# Patient Record
Sex: FEMALE | Race: WHITE | NOT HISPANIC OR LATINO
[De-identification: names, ages, dates, MRNs, and addresses within clinical notes are randomized per-mention and may not be internally consistent; named-entity substitution may affect disease eponyms.]

---

## 2017-08-30 ENCOUNTER — APPOINTMENT (OUTPATIENT)
Dept: GASTROENTEROLOGY | Facility: CLINIC | Age: 77
End: 2017-08-30
Payer: MEDICARE

## 2017-08-30 PROCEDURE — 99214 OFFICE O/P EST MOD 30 MIN: CPT

## 2017-08-30 PROCEDURE — 82274 ASSAY TEST FOR BLOOD FECAL: CPT | Mod: QW

## 2017-08-30 PROCEDURE — 99204 OFFICE O/P NEW MOD 45 MIN: CPT

## 2018-07-12 ENCOUNTER — TRANSCRIPTION ENCOUNTER (OUTPATIENT)
Age: 78
End: 2018-07-12

## 2019-09-24 ENCOUNTER — APPOINTMENT (OUTPATIENT)
Dept: GASTROENTEROLOGY | Facility: CLINIC | Age: 79
End: 2019-09-24
Payer: MEDICARE

## 2019-09-24 VITALS
WEIGHT: 173 LBS | BODY MASS INDEX: 33.96 KG/M2 | HEIGHT: 60 IN | SYSTOLIC BLOOD PRESSURE: 135 MMHG | DIASTOLIC BLOOD PRESSURE: 85 MMHG | HEART RATE: 90 BPM

## 2019-09-24 DIAGNOSIS — I10 ESSENTIAL (PRIMARY) HYPERTENSION: ICD-10-CM

## 2019-09-24 DIAGNOSIS — D47.2 MONOCLONAL GAMMOPATHY: ICD-10-CM

## 2019-09-24 DIAGNOSIS — E11.9 TYPE 2 DIABETES MELLITUS W/OUT COMPLICATIONS: ICD-10-CM

## 2019-09-24 DIAGNOSIS — K57.30 DIVERTICULOSIS OF LARGE INTESTINE W/OUT PERFORATION OR ABSCESS W/OUT BLEEDING: ICD-10-CM

## 2019-09-24 DIAGNOSIS — Z80.0 FAMILY HISTORY OF MALIGNANT NEOPLASM OF DIGESTIVE ORGANS: ICD-10-CM

## 2019-09-24 DIAGNOSIS — Z86.2 PERSONAL HISTORY OF DISEASES OF THE BLOOD AND BLOOD-FORMING ORGANS AND CERTAIN DISORDERS INVOLVING THE IMMUNE MECHANISM: ICD-10-CM

## 2019-09-24 DIAGNOSIS — E78.5 HYPERLIPIDEMIA, UNSPECIFIED: ICD-10-CM

## 2019-09-24 DIAGNOSIS — E66.9 OBESITY, UNSPECIFIED: ICD-10-CM

## 2019-09-24 DIAGNOSIS — E03.9 HYPOTHYROIDISM, UNSPECIFIED: ICD-10-CM

## 2019-09-24 DIAGNOSIS — Z86.010 PERSONAL HISTORY OF COLONIC POLYPS: ICD-10-CM

## 2019-09-24 DIAGNOSIS — Z82.49 FAMILY HISTORY OF ISCHEMIC HEART DISEASE AND OTHER DISEASES OF THE CIRCULATORY SYSTEM: ICD-10-CM

## 2019-09-24 DIAGNOSIS — I72.8 ANEURYSM OF OTHER SPECIFIED ARTERIES: ICD-10-CM

## 2019-09-24 DIAGNOSIS — R10.10 UPPER ABDOMINAL PAIN, UNSPECIFIED: ICD-10-CM

## 2019-09-24 DIAGNOSIS — K76.0 FATTY (CHANGE OF) LIVER, NOT ELSEWHERE CLASSIFIED: ICD-10-CM

## 2019-09-24 DIAGNOSIS — I34.1 NONRHEUMATIC MITRAL (VALVE) PROLAPSE: ICD-10-CM

## 2019-09-24 PROCEDURE — 99214 OFFICE O/P EST MOD 30 MIN: CPT

## 2019-09-24 PROCEDURE — 82274 ASSAY TEST FOR BLOOD FECAL: CPT | Mod: QW

## 2019-09-24 RX ORDER — LOSARTAN POTASSIUM AND HYDROCHLOROTHIAZIDE 50; 12.5 MG/1; MG/1
50-12.5 TABLET, FILM COATED ORAL
Refills: 0 | Status: ACTIVE | COMMUNITY

## 2019-09-24 RX ORDER — ATORVASTATIN CALCIUM 20 MG/1
20 TABLET, FILM COATED ORAL
Refills: 0 | Status: ACTIVE | COMMUNITY

## 2019-09-24 RX ORDER — METFORMIN HYDROCHLORIDE 500 MG/1
500 TABLET, COATED ORAL
Refills: 0 | Status: ACTIVE | COMMUNITY

## 2019-09-24 NOTE — HISTORY OF PRESENT ILLNESS
[FreeTextEntry1] : For several weeks, Sugar has noted intermittent upper abdominal pain. However, she awakened 9/22 with pain, shaking chills and low-grade fever. The fever subsequently went to as high as 101.9°, and then 100° by that evening. For a short time, the pain traveled to the LLQ, but was intermittent rather than steady. The pain has finally subsided, but she notes residual nausea. She had tried some Pepto-Bismol, with improvement. She wonders if she might have had pancreatitis or diverticulitis.

## 2019-09-24 NOTE — REVIEW OF SYSTEMS
[Fever] : fever [Feeling Tired] : feeling tired [Heart Rate Is Fast] : fast heart rate [Feeling Poorly] : feeling poorly [Lower Ext Edema] : lower extremity edema [Shortness Of Breath] : shortness of breath [Negative] : Heme/Lymph

## 2019-09-24 NOTE — REASON FOR VISIT
[Follow-Up: _____] : a [unfilled] follow-up visit [Spouse] : spouse [FreeTextEntry1] : Abdominal pain, nausea, fever

## 2019-09-24 NOTE — ASSESSMENT
[FreeTextEntry1] : 1. Recent epigastric pain, nausea, fever--possible cholelithiasis, viral syndrome, diverticulitis, pancreatitis.\par 2. History of Helicobacter pylori gastritis, duodenitis at EGD August 2013.\par 3. Remote history of colonic polyps; diverticulosis and hemorrhoids at last colonoscopy August 2013; family history of colon cancer (mother). Status post sigmoid resection for diverticulitis 2009. Stool guaiac negative with negative FIT on exam. \par 4. History of iron deficiency anemia, negative capsule endoscopy 2014.\par 5. Hepatic steatosis, without cirrhosis.\par 6. Obesity.\par 7. Type 2 diabetes mellitus.\par 8. Status post right breast lumpectomy for DCIS 2012.\par 9. MGUS, with faint IgM-lambda monoclonal protein with normal free K/L ratio.\par 10. Hypertension.\par 11. Hypothyroidism.\par 12. Hyperlipidemia.\par 13. Mitral valve prolapse.\par 14. Osteoarthritis, status post synovial cyst excision 2016.\par 15. Splenic artery aneurysm, occluded inferior mesenteric artery.\par 16. Status post WHITNEY, appendectomy, 3 C-sections, hemorrhoidectomies.\par 17. Mild CAD on CT angiography.\par 18. Allergic to Flagyl, codeine, Valium, tramadol.\par \par Plan:\par 1. Medical records, including recent labs and CTA reviewed.\par 2. CT scan abdomen/pelvis with oral and intravenous contrast. She is aware of the need to hold metformin around the time of the test.\par 3. Pepto-Bismol as needed for now.\par 4. Other recommendations depending on results of above and clinical course.\par 5. Get an internist!!

## 2019-09-24 NOTE — PHYSICAL EXAM
[General Appearance - Alert] : alert [General Appearance - In No Acute Distress] : in no acute distress [General Appearance - Well Nourished] : well nourished [General Appearance - Well Developed] : well developed [Sclera] : the sclera and conjunctiva were normal [Outer Ear] : the ears and nose were normal in appearance [Oropharynx] : the oropharynx was normal [Neck Cervical Mass (___cm)] : no neck mass was observed [Neck Appearance] : the appearance of the neck was normal [Thyroid Diffuse Enlargement] : the thyroid was not enlarged [Jugular Venous Distention Increased] : there was no jugular-venous distention [Thyroid Nodule] : there were no palpable thyroid nodules [Auscultation Breath Sounds / Voice Sounds] : lungs were clear to auscultation bilaterally [Heart Rate And Rhythm] : heart rate was normal and rhythm regular [Murmurs] : no murmurs [Heart Sounds Gallop] : no gallops [Heart Sounds Pericardial Friction Rub] : no pericardial rub [Edema] : there was no peripheral edema [Full Pulse] : the pedal pulses are present [Bowel Sounds] : normal bowel sounds [Abdomen Tenderness] : non-tender [Abdomen Soft] : soft [Abdomen Mass (___ Cm)] : no abdominal mass palpated [Normal Sphincter Tone] : normal sphincter tone [No Rectal Mass] : no rectal mass [Internal Hemorrhoid] : internal hemorrhoids [Cervical Lymph Nodes Enlarged Posterior Bilaterally] : posterior cervical [Supraclavicular Lymph Nodes Enlarged Bilaterally] : supraclavicular [Cervical Lymph Nodes Enlarged Anterior Bilaterally] : anterior cervical [Inguinal Lymph Nodes Enlarged Bilaterally] : inguinal [Axillary Lymph Nodes Enlarged Bilaterally] : axillary [Femoral Lymph Nodes Enlarged Bilaterally] : femoral [No CVA Tenderness] : no ~M costovertebral angle tenderness [No Spinal Tenderness] : no spinal tenderness [Nail Clubbing] : no clubbing  or cyanosis of the fingernails [Abnormal Walk] : normal gait [Motor Tone] : muscle strength and tone were normal [Musculoskeletal - Swelling] : no joint swelling seen [Skin Turgor] : normal skin turgor [Skin Color & Pigmentation] : normal skin color and pigmentation [Oriented To Time, Place, And Person] : oriented to person, place, and time [] : no rash [Affect] : the affect was normal [Impaired Insight] : insight and judgment were intact [FreeTextEntry1] : reducible umbilical hernia; surgical scars [External Hemorrhoid] : no external hemorrhoids [Occult Blood Positive] : stool was negative for occult blood

## 2019-09-25 ENCOUNTER — TRANSCRIPTION ENCOUNTER (OUTPATIENT)
Age: 79
End: 2019-09-25

## 2019-09-26 ENCOUNTER — CLINICAL ADVICE (OUTPATIENT)
Age: 79
End: 2019-09-26

## 2019-10-03 ENCOUNTER — LABORATORY RESULT (OUTPATIENT)
Age: 79
End: 2019-10-03

## 2019-10-03 ENCOUNTER — CLINICAL ADVICE (OUTPATIENT)
Age: 79
End: 2019-10-03

## 2019-10-04 ENCOUNTER — APPOINTMENT (OUTPATIENT)
Dept: GASTROENTEROLOGY | Facility: CLINIC | Age: 79
End: 2019-10-04
Payer: MEDICARE

## 2019-10-04 PROCEDURE — 43239 EGD BIOPSY SINGLE/MULTIPLE: CPT

## 2019-10-07 ENCOUNTER — OTHER (OUTPATIENT)
Age: 79
End: 2019-10-07

## 2019-10-11 ENCOUNTER — CHART COPY (OUTPATIENT)
Age: 79
End: 2019-10-11

## 2019-10-18 ENCOUNTER — CLINICAL ADVICE (OUTPATIENT)
Age: 79
End: 2019-10-18

## 2019-10-28 ENCOUNTER — APPOINTMENT (OUTPATIENT)
Dept: GASTROENTEROLOGY | Facility: CLINIC | Age: 79
End: 2019-10-28
Payer: MEDICARE

## 2019-10-28 VITALS
HEIGHT: 60 IN | WEIGHT: 175.5 LBS | HEART RATE: 74 BPM | BODY MASS INDEX: 34.46 KG/M2 | SYSTOLIC BLOOD PRESSURE: 132 MMHG | DIASTOLIC BLOOD PRESSURE: 83 MMHG

## 2019-10-28 DIAGNOSIS — B96.81 GASTRITIS, UNSPECIFIED, W/OUT BLEEDING: ICD-10-CM

## 2019-10-28 DIAGNOSIS — K80.20 CALCULUS OF GALLBLADDER W/OUT CHOLECYSTITIS W/OUT OBSTRUCTION: ICD-10-CM

## 2019-10-28 DIAGNOSIS — K29.70 GASTRITIS, UNSPECIFIED, W/OUT BLEEDING: ICD-10-CM

## 2019-10-28 PROCEDURE — 99214 OFFICE O/P EST MOD 30 MIN: CPT

## 2019-10-28 RX ORDER — AMOXICILLIN 500 MG/1
500 TABLET, FILM COATED ORAL TWICE DAILY
Qty: 56 | Refills: 0 | Status: DISCONTINUED | COMMUNITY
Start: 2019-10-11 | End: 2019-10-28

## 2019-10-28 RX ORDER — CLARITHROMYCIN 500 MG/1
500 TABLET, FILM COATED ORAL TWICE DAILY
Qty: 28 | Refills: 0 | Status: DISCONTINUED | COMMUNITY
Start: 2019-10-11 | End: 2019-10-28

## 2019-10-28 NOTE — ASSESSMENT
[FreeTextEntry1] : 1. Recent epigastric pain, nausea, with Helicobacter pylori gastritis, gastric intestinal metaplasia, nonerosive esophagitis at repeat EGD October 2019--difficulty tolerating clarithromycin, but may very well have cleared the H. pylori.\par 2. Remote history of colonic polyps; diverticulosis and hemorrhoids at last colonoscopy August 2013; family history of colon cancer (mother). Status post sigmoid resection for diverticulitis 2009. Stool guaiac negative with negative FIT on exam. \par 3. History of iron deficiency anemia, negative capsule endoscopy 2014.\par 4. Hepatic steatosis, without cirrhosis. Gallstones, likely asymptomatic. \par 5. Obesity.\par 6. Type 2 diabetes mellitus.\par 7. Status post right breast lumpectomy for DCIS 2012.\par 8. MGUS, with faint IgM-lambda monoclonal protein with normal free K/L ratio.\par 9. Hypertension.\par 10. Hypothyroidism.\par 11. Hyperlipidemia.\par 12. Mitral valve prolapse.\par 13. Osteoarthritis, status post synovial cyst excision 2016.\par 14. Splenic artery aneurysm, occluded inferior mesenteric artery.\par 15. Status post WHITNEY, appendectomy, 3 C-sections, hemorrhoidectomies.\par 16. Mild CAD on CT angiography.\par 17. Allergic to Flagyl, codeine, Valium, tramadol; intolerant of clarithromycin..\par \par Plan:\par 1. Labs reviewed. \par 2. Continue omeprazole daily on awakening for at least a couple of months.\par 3. Submit stool specimen to lab for Helicobacter pylori antigen in mid-November.\par 4. She will call for followup soon thereafter.\par 5. Get an internist!!

## 2019-10-28 NOTE — HISTORY OF PRESENT ILLNESS
[FreeTextEntry1] : EGD 10/4/19 by Dr. Hester revealed Helicobacter pylori gastritis with gastric intestinal metaplasia, and nonerosive reflux. She was taking triple therapy for one week, but telephoned on 10/18, complaining of nausea, decreased appetite, lethargy, and difficulty sleeping. I advised her to stop the antibiotics, although I had suspected that clarithromycin was the more likely culprit (she has taken amoxicillin on multiple occasions without problem). By 10/22, she was very beginning to feel back to baseline,t with no further abdominal pain. Labs from 10/17 were also reviewed.

## 2019-11-15 ENCOUNTER — TRANSCRIPTION ENCOUNTER (OUTPATIENT)
Age: 79
End: 2019-11-15

## 2019-11-21 ENCOUNTER — OTHER (OUTPATIENT)
Age: 79
End: 2019-11-21

## 2020-01-02 ENCOUNTER — CLINICAL ADVICE (OUTPATIENT)
Age: 80
End: 2020-01-02

## 2020-01-13 ENCOUNTER — APPOINTMENT (OUTPATIENT)
Dept: GASTROENTEROLOGY | Facility: CLINIC | Age: 80
End: 2020-01-13
Payer: MEDICARE

## 2020-01-13 VITALS
BODY MASS INDEX: 34.16 KG/M2 | WEIGHT: 174 LBS | HEART RATE: 97 BPM | SYSTOLIC BLOOD PRESSURE: 116 MMHG | DIASTOLIC BLOOD PRESSURE: 71 MMHG | HEIGHT: 60 IN

## 2020-01-13 DIAGNOSIS — R11.0 NAUSEA: ICD-10-CM

## 2020-01-13 DIAGNOSIS — R10.13 EPIGASTRIC PAIN: ICD-10-CM

## 2020-01-13 DIAGNOSIS — R14.0 ABDOMINAL DISTENSION (GASEOUS): ICD-10-CM

## 2020-01-13 PROCEDURE — 99214 OFFICE O/P EST MOD 30 MIN: CPT

## 2020-01-13 RX ORDER — CYANOCOBALAMIN (VITAMIN B-12) 1000 MCG
TABLET ORAL
Refills: 0 | Status: ACTIVE | COMMUNITY

## 2020-01-13 RX ORDER — METFORMIN ER 500 MG 500 MG/1
500 TABLET ORAL
Qty: 90 | Refills: 0 | Status: ACTIVE | COMMUNITY
Start: 2019-07-11

## 2020-01-13 NOTE — REVIEW OF SYSTEMS
[Chills] : chills [Feeling Tired] : feeling tired [Feeling Poorly] : feeling poorly [Heart Rate Is Slow] : slow heart rate [Abdominal Pain] : abdominal pain [Diarrhea] : diarrhea [Feelings Of Weakness] : feelings of weakness [Negative] : Heme/Lymph [As Noted in HPI] : as noted in HPI

## 2020-01-16 ENCOUNTER — OTHER (OUTPATIENT)
Age: 80
End: 2020-01-16

## 2020-01-16 NOTE — HISTORY OF PRESENT ILLNESS
[FreeTextEntry1] : For the past couple weeks, she has noted relatively constant low retrosternal and epigastric burning, decreased appetite, nausea, excessive flatulence, and loose stools. She has only been able to tolerate some clear broth. Temperatures have run between 97 and 100°. She has tried Pepto-Bismol with only minor improvement. She is not currently taking PPI. Recent labs suggested the possibility of UTI, but C&S was not performed. She wonders if she might have Helicobacter pylori back (stool negative in November). She is scheduled to cruise on the "[employer] from [place] to [place] at the end of next week.

## 2020-01-16 NOTE — ASSESSMENT
[FreeTextEntry1] : 1. Recent epigastric and low retrosternal pain, anorexia, nausea, loose stools, gassiness; Helicobacter pylori gastritis, gastric intestinal metaplasia, nonerosive esophagitis at repeat EGD October 2019--note abnormal urinalysis, suggests possible UTI. May have associated UGI motility disorder, bacterial overgrowth, Clostridium difficile, recurrent H. pylori, etc. \par 2. Remote history of colonic polyps; diverticulosis and hemorrhoids at last colonoscopy August 2013; family history of colon cancer (mother). Status post sigmoid resection for diverticulitis 2009.  \par 3. History of iron deficiency anemia, negative capsule endoscopy 2014.\par 4. Hepatic steatosis, without cirrhosis. Gallstones, likely asymptomatic. \par 5. Obesity.\par 6. Type 2 diabetes mellitus.\par 7. Status post right breast lumpectomy for DCIS 2012.\par 8. MGUS, with faint IgM-lambda monoclonal protein with normal free K/L ratio.\par 9. Hypertension.\par 10. Hypothyroidism.\par 11. Hyperlipidemia.\par 12. Mitral valve prolapse.\par 13. Osteoarthritis, status post synovial cyst excision 2016.\par 14. Splenic artery aneurysm, occluded inferior mesenteric artery.\par 15. Status post WHITNEY, appendectomy, 3 C-sections, hemorrhoidectomies.\par 16. Mild CAD on CT angiography.\par 17. Allergic to Flagyl, codeine, Valium, tramadol; intolerant of clarithromycin..\par \par Plan:\par 1. Labs reviewed. \par 2. Submit urine specimen for C&S; if positive, she will contact her other doctors for treatment.\par 3. Rechallenge with Omeprazole 20 mg b.i.d. on an empty stomach.\par 4. Consider checking stools for H. pylori and and  C. difficile, depending on clinical course.\par 5. Might also consider a short course of Xifaxan.\par 6. Not certain that she will be able to go on the cruise next week. \par 7. Decrease Metformin to 500 mg/day (from 3x/day) as she is not eating much and her sugars have been in the 70-90 range.\par 8. She also has been advised to decrease her antihypertensives for now.

## 2020-01-16 NOTE — PHYSICAL EXAM
[General Appearance - Alert] : alert [General Appearance - In No Acute Distress] : in no acute distress [General Appearance - Well Developed] : well developed [General Appearance - Well Nourished] : well nourished [Bowel Sounds] : normal bowel sounds [Abdomen Soft] : soft [Abdomen Mass (___ Cm)] : no abdominal mass palpated [] : no hepato-splenomegaly [Impaired Insight] : insight and judgment were intact [Oriented To Time, Place, And Person] : oriented to person, place, and time [Affect] : the affect was normal [FreeTextEntry1] : mild epigastric tenderness; reducible umbilical hernia; surgical scars

## 2020-01-16 NOTE — CONSULT LETTER
[Dear  ___] : Dear  [unfilled], [Please see my note below.] : Please see my note below. [Consult Closing:] : Thank you very much for allowing me to participate in the care of this patient.  If you have any questions, please do not hesitate to contact me. [Courtesy Letter:] : I had the pleasure of seeing your patient, [unfilled], in my office today. [Sincerely,] : Sincerely, [FreeTextEntry3] : Juan Miguel Cyr M.D.\par

## 2020-05-22 ENCOUNTER — RX RENEWAL (OUTPATIENT)
Age: 80
End: 2020-05-22

## 2020-12-02 ENCOUNTER — RX RENEWAL (OUTPATIENT)
Age: 80
End: 2020-12-02

## 2021-04-01 ENCOUNTER — RX RENEWAL (OUTPATIENT)
Age: 81
End: 2021-04-01

## 2021-08-02 ENCOUNTER — RX RENEWAL (OUTPATIENT)
Age: 81
End: 2021-08-02

## 2021-08-02 RX ORDER — OMEPRAZOLE 20 MG/1
20 CAPSULE, DELAYED RELEASE ORAL
Qty: 90 | Refills: 1 | Status: ACTIVE | COMMUNITY
Start: 2019-10-11 | End: 1900-01-01

## 2022-08-19 ENCOUNTER — APPOINTMENT (OUTPATIENT)
Dept: ULTRASOUND IMAGING | Facility: CLINIC | Age: 82
End: 2022-08-19

## 2022-08-19 PROCEDURE — 76536 US EXAM OF HEAD AND NECK: CPT

## 2022-10-28 ENCOUNTER — NON-APPOINTMENT (OUTPATIENT)
Age: 82
End: 2022-10-28

## 2022-12-23 ENCOUNTER — NON-APPOINTMENT (OUTPATIENT)
Age: 82
End: 2022-12-23

## 2023-06-09 ENCOUNTER — APPOINTMENT (OUTPATIENT)
Dept: PLASTIC SURGERY | Facility: CLINIC | Age: 83
End: 2023-06-09

## 2023-07-24 ENCOUNTER — APPOINTMENT (OUTPATIENT)
Dept: PLASTIC SURGERY | Facility: CLINIC | Age: 83
End: 2023-07-24

## 2023-09-07 ENCOUNTER — APPOINTMENT (OUTPATIENT)
Dept: PLASTIC SURGERY | Facility: CLINIC | Age: 83
End: 2023-09-07
Payer: MEDICARE

## 2023-09-07 VITALS
OXYGEN SATURATION: 97 % | BODY MASS INDEX: 29.25 KG/M2 | DIASTOLIC BLOOD PRESSURE: 82 MMHG | WEIGHT: 149 LBS | HEART RATE: 84 BPM | HEIGHT: 60 IN | TEMPERATURE: 96.7 F | SYSTOLIC BLOOD PRESSURE: 140 MMHG

## 2023-09-07 DIAGNOSIS — R92.8 OTHER ABNORMAL AND INCONCLUSIVE FINDINGS ON DIAGNOSTIC IMAGING OF BREAST: ICD-10-CM

## 2023-09-07 DIAGNOSIS — Z86.000 PERSONAL HISTORY OF IN-SITU NEOPLASM OF BREAST: ICD-10-CM

## 2023-09-07 PROCEDURE — 99213 OFFICE O/P EST LOW 20 MIN: CPT

## 2023-09-07 NOTE — CONSULT LETTER
[Dear  ___] : Dear  [unfilled], [Courtesy Letter:] : I had the pleasure of seeing your patient, [unfilled], in my office today. [Please see my note below.] : Please see my note below. [Sincerely,] : Sincerely, [FreeTextEntry3] : Susan M. Palleschi, MD, FACS Division of Breast Surgery Director, Breast Surgery [address] [address] (Phone) [phone] (Fax) [phone]

## 2023-09-07 NOTE — HISTORY OF PRESENT ILLNESS
[FreeTextEntry1] : Patient is a 83 year old female here today for a follow up visit. She has a personal history of right breast DCIS s/p right lumpectomy, no XRT She has a history of right upper lymphedema, of unknown etiology.  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 10/05/2022 Bilateral mammogram: No suspicious masses, calcifications, or other findings are seen. There is stable postsurgical distortion in the right upper outer quadrant. 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 There is no significant interval change compared to prior exams. Bilateral ultrasound:   Right breast 6:00 N3 1.3 x 1.5 x 0.9 cm ovoid isoechoic benign-appearing mass is noted, stable. Right breast 9:00 N8 7 x 3 x 5 mm benign intramammary lymph node is noted, stable. Right breast 10:00 N3 8 x 4 x 10 mm ovoid hypoechoic nodule is noted, measures slightly increased compared to prior exams, which may be secondary to technique, for which six-month follow-up ultrasound is recommended to ensure stability. Right breast 10:00 N8 1.8 x 0.7 x 2 cm avoid fat-containing mass is noted, stable. Right retroareolar 3:00 8 x 8  5 mm ovoid hypoechoic nodule is noted, stable. Left retroareolar 12:00 8 x 5 x8 mm ovoid hypoechoic mass is noted, decreased compared to prior exams. Left breast 12:00 N3 1.6 x 1.5 x 0.6 cm ovoid hypoechoic mass is noted, stable. Left breast 1:00 N6 2.5 x 1.1 x 1.7 cm ovoid fat-containing mass is noted, stable. Left retroareolar 2:00 8 x 4 x 7 mm ovoid hypoechoic nodule is noted, stable. Left 3:00 N3 11 x 6 x 10 mm ovoid hypoechoic mass is noted, stable. Left 4:00 N7 2.1 x 0.9  1.8 cm fat-containing mass is noted, stable. Left 4:00 N7 adjacent 1.9 x 1.5 x 0.8 cm similar fat-containing mass is noted, not previously seen, for which six-month follow-up ultrasound is recommended to ensure stability. The mass likely corresponds to stable mammographic 2 adjacent masses In the lower outer left breast. Left 6:00 N1 7 x 7 x 5 mm ovoid hypoechoic nodule is noted, stable. Left breast 10:00 N4 2 x 0.8 x 1.8 cm ovoid fat-containing mass is noted, stable. No suspicious abnormalities were seen.  A 6 month follow up is recommended. BI-RADS 3 4/03/2023 Bilateral ultrasound:  A stable 0.8 cm x 0.4 cm x 1 cm oval parallel circumscribed mixed echogenicity benign-appearing mass in right breast 10:00 axis (3 cm from nipple).  A stable 1.5 cm x 0.8 cm x 1.9 m oval parallel circumscribed mixed echogenicity benign-appearing mass in left breast at 4:00 axis (7 cm from nipple). Resume to annual screening schedule mammogram of both breast is recommended. BI-RADS 2 She notes an intemittent bilateral breast inframammary fold rash over the last year. She was prescribed a cream by her dermatologist.

## 2023-09-07 NOTE — PHYSICAL EXAM
[Normocephalic] : normocephalic [Atraumatic] : atraumatic [EOMI] : extra ocular movement intact [Sclera nonicteric] : sclera nonicteric [Supple] : supple [No Supraclavicular Adenopathy] : no supraclavicular adenopathy [No Cervical Adenopathy] : no cervical adenopathy [No Thyromegaly] : no thyromegaly [Examined in the supine and seated position] : examined in the supine and seated position [Asymmetrical] : asymmetrical [No dominant masses] : no dominant masses in right breast  [No dominant masses] : no dominant masses left breast [No Nipple Retraction] : no left nipple retraction [No Nipple Discharge] : no left nipple discharge [No Axillary Lymphadenopathy] : no left axillary lymphadenopathy [No Edema] : no edema [No Rashes] : no rashes [No Ulceration] : no ulceration [de-identified] : left breast is larger than her right; no rashes

## 2023-09-07 NOTE — ASSESSMENT
[FreeTextEntry1] : History of right breast DCIS status post right lumpectomy. Bilateral breast masses on ultrasound with most recent 6-month follow-up ultrasound revealing stable findings. Clinical breast examination negative.  1. Annual bilateral mammogram and breast ultrasound due 10/2023. 2. Follow up office visit 1 year 3. Advised monthly self breast examinations and advised her to contact me if she has any concerns.

## 2023-11-21 DIAGNOSIS — B37.2 CANDIDIASIS OF SKIN AND NAIL: ICD-10-CM

## 2023-11-21 RX ORDER — NYSTATIN AND TRIAMCINOLONE ACETONIDE 100000; 1 MG/G; MG/G
100000-0.1 CREAM TOPICAL TWICE DAILY
Qty: 2 | Refills: 3 | Status: ACTIVE | COMMUNITY
Start: 2023-11-21 | End: 1900-01-01

## 2024-10-08 ENCOUNTER — APPOINTMENT (OUTPATIENT)
Dept: PLASTIC SURGERY | Facility: CLINIC | Age: 84
End: 2024-10-08

## 2025-03-24 ENCOUNTER — APPOINTMENT (OUTPATIENT)
Dept: PLASTIC SURGERY | Facility: CLINIC | Age: 85
End: 2025-03-24
Payer: MEDICARE

## 2025-03-24 VITALS
HEART RATE: 73 BPM | DIASTOLIC BLOOD PRESSURE: 88 MMHG | WEIGHT: 149 LBS | BODY MASS INDEX: 23.39 KG/M2 | TEMPERATURE: 97.2 F | SYSTOLIC BLOOD PRESSURE: 137 MMHG | OXYGEN SATURATION: 98 % | HEIGHT: 67 IN

## 2025-03-24 DIAGNOSIS — Z86.000 PERSONAL HISTORY OF IN-SITU NEOPLASM OF BREAST: ICD-10-CM

## 2025-03-24 PROCEDURE — 99213 OFFICE O/P EST LOW 20 MIN: CPT
